# Patient Record
Sex: FEMALE | Race: WHITE | Employment: OTHER | URBAN - METROPOLITAN AREA
[De-identification: names, ages, dates, MRNs, and addresses within clinical notes are randomized per-mention and may not be internally consistent; named-entity substitution may affect disease eponyms.]

---

## 2017-01-18 RX ORDER — CLONIDINE HYDROCHLORIDE 0.1 MG/1
0.1 TABLET ORAL 2 TIMES DAILY
Qty: 180 TABLET | Refills: 2 | Status: SHIPPED | OUTPATIENT
Start: 2017-01-18 | End: 2017-10-12 | Stop reason: SDUPTHER

## 2017-04-03 RX ORDER — ATORVASTATIN CALCIUM 20 MG/1
20 TABLET, FILM COATED ORAL DAILY
Qty: 90 TABLET | Refills: 0 | Status: SHIPPED | OUTPATIENT
Start: 2017-04-03 | End: 2018-08-13 | Stop reason: SDUPTHER

## 2017-04-06 RX ORDER — ATORVASTATIN CALCIUM 20 MG/1
TABLET, FILM COATED ORAL
Qty: 30 TABLET | Refills: 10 | Status: SHIPPED | OUTPATIENT
Start: 2017-04-06 | End: 2018-08-13 | Stop reason: SDUPTHER

## 2017-05-22 ENCOUNTER — TELEPHONE (OUTPATIENT)
Dept: CARDIOLOGY CLINIC | Age: 62
End: 2017-05-22

## 2017-05-22 DIAGNOSIS — I10 ESSENTIAL HYPERTENSION, BENIGN: Primary | ICD-10-CM

## 2017-05-22 DIAGNOSIS — Z79.899 ENCOUNTER FOR LONG-TERM (CURRENT) USE OF MEDICATIONS: ICD-10-CM

## 2017-05-22 DIAGNOSIS — I42.8 OTHER PRIMARY CARDIOMYOPATHIES: ICD-10-CM

## 2017-05-22 RX ORDER — FUROSEMIDE 20 MG/1
20 TABLET ORAL DAILY
Qty: 30 TABLET | Refills: 6 | Status: SHIPPED | OUTPATIENT
Start: 2017-05-22 | End: 2019-12-19

## 2017-06-16 RX ORDER — LISINOPRIL 20 MG/1
TABLET ORAL
Qty: 180 TABLET | Refills: 1 | Status: SHIPPED | OUTPATIENT
Start: 2017-06-16 | End: 2017-12-15 | Stop reason: SDUPTHER

## 2017-09-29 ENCOUNTER — TELEPHONE (OUTPATIENT)
Dept: CARDIOLOGY CLINIC | Age: 62
End: 2017-09-29

## 2017-09-29 NOTE — TELEPHONE ENCOUNTER
Patient called for refills of Coreg 25 mg. Would like this called to [facility] in [place]. Looks like her last appointment was 3/28/16.

## 2017-10-02 RX ORDER — CARVEDILOL 25 MG/1
TABLET ORAL
Qty: 180 TABLET | Refills: 3 | Status: SHIPPED | OUTPATIENT
Start: 2017-10-02 | End: 2018-10-01 | Stop reason: SDUPTHER

## 2017-10-13 RX ORDER — CLONIDINE HYDROCHLORIDE 0.1 MG/1
TABLET ORAL
Qty: 60 TABLET | Refills: 3 | Status: SHIPPED | OUTPATIENT
Start: 2017-10-13 | End: 2018-05-04 | Stop reason: SDUPTHER

## 2017-12-15 ENCOUNTER — TELEPHONE (OUTPATIENT)
Dept: CARDIOLOGY CLINIC | Age: 62
End: 2017-12-15

## 2017-12-15 RX ORDER — LISINOPRIL 20 MG/1
TABLET ORAL
Qty: 180 TABLET | Refills: 0 | Status: SHIPPED | OUTPATIENT
Start: 2017-12-15 | End: 2018-03-26 | Stop reason: SDUPTHER

## 2018-03-26 RX ORDER — LISINOPRIL 20 MG/1
TABLET ORAL
Qty: 180 TABLET | Refills: 0 | Status: SHIPPED | OUTPATIENT
Start: 2018-03-26 | End: 2018-06-21 | Stop reason: SDUPTHER

## 2018-03-26 NOTE — TELEPHONE ENCOUNTER
Patient called, rx was denied due to not being seen since 3/16. She states she has been out of work and will obtain insurance 6/18. appt made for 6/22/18 to see [id number]. Ok to fill Lisinopril?

## 2018-05-07 RX ORDER — CLONIDINE HYDROCHLORIDE 0.1 MG/1
TABLET ORAL
Qty: 60 TABLET | Refills: 3 | Status: SHIPPED | OUTPATIENT
Start: 2018-05-07 | End: 2018-10-01 | Stop reason: SDUPTHER

## 2018-06-22 RX ORDER — LISINOPRIL 20 MG/1
TABLET ORAL
Qty: 180 TABLET | Refills: 0 | Status: SHIPPED | OUTPATIENT
Start: 2018-06-22 | End: 2018-10-04 | Stop reason: SDUPTHER

## 2018-08-13 ENCOUNTER — OFFICE VISIT (OUTPATIENT)
Dept: CARDIOLOGY CLINIC | Age: 63
End: 2018-08-13

## 2018-08-13 VITALS
HEART RATE: 72 BPM | BODY MASS INDEX: 26.62 KG/M2 | DIASTOLIC BLOOD PRESSURE: 90 MMHG | WEIGHT: 141 LBS | HEIGHT: 61 IN | SYSTOLIC BLOOD PRESSURE: 154 MMHG

## 2018-08-13 DIAGNOSIS — I42.9 PRIMARY CARDIOMYOPATHY (HCC): ICD-10-CM

## 2018-08-13 DIAGNOSIS — I10 ESSENTIAL HYPERTENSION, BENIGN: Primary | ICD-10-CM

## 2018-08-13 DIAGNOSIS — E78.5 HYPERLIPIDEMIA, UNSPECIFIED HYPERLIPIDEMIA TYPE: ICD-10-CM

## 2018-08-13 DIAGNOSIS — R55 SYNCOPE, UNSPECIFIED SYNCOPE TYPE: ICD-10-CM

## 2018-08-13 PROCEDURE — 99214 OFFICE O/P EST MOD 30 MIN: CPT | Performed by: INTERNAL MEDICINE

## 2018-08-13 PROCEDURE — 93000 ELECTROCARDIOGRAM COMPLETE: CPT | Performed by: INTERNAL MEDICINE

## 2018-08-13 RX ORDER — ATORVASTATIN CALCIUM 20 MG/1
TABLET, FILM COATED ORAL
Qty: 90 TABLET | Refills: 3 | Status: SHIPPED | OUTPATIENT
Start: 2018-08-13 | End: 2019-12-19 | Stop reason: SDUPTHER

## 2018-08-13 NOTE — PROGRESS NOTES
[address]   Cardiac Evaluation      Patient: Sammi Layton  YOB: 1955  Date: 8/13/18     Chief Complaint   Patient presents with    Hypertension    Cardiomyopathy      Referring provider: Shira Capone MD    History of Present Illness:   Ms. Jose L Ochoa is seen today for follow up of her hypertension. She is also treated for cardiomyopathy. She has not been seen since 2016; she has not had insurance. She is eligible for Medicare but has not signed up for this. An angiogram performed in 2006 revealed normal coronaries and EF 25%. Most recent ECHO stable. Today, Ms. Barroso states that she feels fairly well. She did have an episode 2 weeks ago where she came home from work and passed out. She states she was feeding her cats, felt her nose was hot and she \"passed out\". She states her blood pressure was 147/103 at the time and blood pressure machine read that her heart rate was irregular. EKG shows NSR today. Past Medical History:   has a past medical history of Cardiomyopathy ([address] and Hyperlipidemia. Surgical History:   has a past surgical history that includes Tonsillectomy. Social History:  History     Social History    Marital Status: Single     Spouse Name: N/A     Number of Children: N/A    Years of Education: N/A     Occupational History    Works in a factory     Social History Main Topics    Smoking status: Never Smoker     Smokeless tobacco: Never Used    Alcohol Use: No    Drug Use: No    Sexually Active:        Family History:  family history includes Alzheimer's Disease in her mother; Asthma in her father. Allergies:  Patient has no known allergies. Review of Systems:   · Constitutional: there has been no unanticipated weight loss. No change in energy or activity level   · Eyes: No visual changes   · ENT: No Headaches, hearing loss or vertigo. No mouth sores or sore throat.   · Cardiovascular: Reviewed in HPI  · Respiratory: No cough or wheezing, today   2. Other primary cardiomyopathies: Improved. -ECHO 3/11/16> -Left ventricle size is normal.  -Mild concentric left ventricular hypertrophy is present.  -Left ventricular function is low normal with ejection fraction estimated at 50-55 %. -There is akinesis of the distal septal wall. -There is reversal of E/A inflow velocities across the mitral valve  suggesting impaired left ventricular relaxation. -There is mild pulmonic and mitral, and trivial aortic and tricuspid regurgitation with RVSP estimated at 19 mmHg. -The left atrium is mildly dilated. -ECHO 9/24/10> essentially normal with EF 55%  -GXT Afdy 5/30/06> anteroapical, septal wall infarct, anteroapical portion of myocardium appears akinetic, abnormal uptake in the inferior wall and inferolateral wall, with some mild reversibility suggested diffuse hypokinesia of wall motion is otherwise demonstrated, EF 23% (false positive for ischemia)  -Angiogram 6/1/06> normal coronary arteries, low LVEDP, reduced right atrial pressure,reduced pulmonary capillary wedge pressure and reduced cardiac output is noted, severe LV dysfunction with EF 25%, mild MR, basil and mid portions of inferior wall appear to be akinetic       PLAN:  CBC, CMP, lipids, Echo. When she gets her insurance she needs an echo. Scribe's attestation: This note was scribed in the presence of Dr Kaylie Ruiz MD by Poncho Allne RN. The scribe's documentation has been prepared under my direction and personally reviewed by me in its entirety. I confirm that the note above accurately reflects all work, treatment, procedures, and medical decision making performed by me. .    Thank you for allowing to me to participate in the care of [address] [place] [address].

## 2018-08-13 NOTE — LETTER
[address] Cardiology [facility]  [address] [place] Coreen Salazar [place] [address]  Phone: [phone]  Fax: [phone]    Stuart Kahn MD        August 22, 2018     Sharri, [address] [place] [address]    Patient: Alberta Craft  MR Number: [id number]  YOB: 1955  Date of Visit: 8/13/2018    Dear Dr. Harrell:    Thank you for the request for consultation for Tonie Ro to me for the evaluation of Martine. Below are the relevant portions of my assessment and plan of care. [facility]   Cardiac Evaluation      Patient: Alberta Craft  YOB: 1955  Date: 8/13/18     Chief Complaint   Patient presents with    Hypertension    Cardiomyopathy      Referring provider: MD Sharri    History of Present Illness:   Ms. Felicia Blake is seen today for follow up of her hypertension. She is also treated for cardiomyopathy. She has not been seen since 2016; she has not had insurance. She is eligible for Medicare but has not signed up for this. An angiogram performed in 2006 revealed normal coronaries and EF 25%. Most recent ECHO stable. Today, Ms. Barroso states that she feels fairly well. She did have an episode 2 weeks ago where she came home from work and passed out. She states she was feeding her cats, felt her nose was hot and she \"passed out\". She states her blood pressure was 147/103 at the time and blood pressure machine read that her heart rate was irregular. EKG shows NSR today. Past Medical History:   has a past medical history of Cardiomyopathy ([address] and Hyperlipidemia. Surgical History:   has a past surgical history that includes Tonsillectomy.      Social History:  History     Social History    Marital Status: Single     Spouse Name: N/A     Number of Children: N/A    Years of Education: N/A     Occupational History    Works in a factory     Social History Main Topics    Smoking status: Never Smoker The scribe's documentation has been prepared under my direction and personally reviewed by me in its entirety. I confirm that the note above accurately reflects all work, treatment, procedures, and medical decision making performed by me. .    Thank you for allowing to me to participate in the care of [address] [place] [address]. If you have questions, please do not hesitate to call me. I look forward to following Abby Cadet along with you.     Sincerely,        Yamel Flores MD

## 2018-08-22 NOTE — COMMUNICATION BODY
no sputum production. No hematemesis. · Gastrointestinal: No abdominal pain, appetite loss, blood in stools. No change in bowel or bladder habits. · Genitourinary: No nocturia, dysuria, trouble voiding  · Musculoskeletal:  No gait disturbance, weakness or joint complaints. · Integumentary: No rash or pruritis. · Neurological: No headache, change in muscle strength, numbness or tingling. No change in gait, balance, coordination, mood, affect, memory, mentation, behavior. · Psychiatric: No anxiety or depression  · Endocrine: No malaise or fever  · Hematologic/Lymphatic: No abnormal bruising or bleeding, blood clots or swollen lymph nodes. · Allergic/Immunologic: No nasal congestion or hives. Physical Examination:    Vitals:    08/13/18 1517 08/13/18 1521   BP: (!) 154/90 (!) 154/90   Site: Right Arm    Position: Sitting    Cuff Size: Medium Adult    Pulse: 72    Weight: 141 lb (64 kg)    Height: 5' 1\" (1.549 m)      Wt Readings from Last 3 Encounters:   08/13/18 141 lb (64 kg)   03/28/16 152 lb (68.9 kg)   03/04/16 149 lb (67.6 kg)     BP Readings from Last 3 Encounters:   08/13/18 (!) 154/90   03/28/16 130/78   03/04/16 (!) 192/110     Constitutional and General Appearance:  appears stated age  Respiratory:  · Normal excursion and expansion without use of accessory muscles  · Resp Auscultation: Normal breath sounds without dullness  Cardiovascular:  · The apical impulses not displaced  · Heart is regular rate and rhythm with normal S1, S2  · The carotid upstroke is normal, no bruit noted   · JVP is not elevated  · Peripheral pulses are symmetrical  · There is no clubbing, cyanosis of the extremities  · No edema  · Femoral Arteries: 2+ and equal  · Pedal Pulses: 2+ and equal   Abdomen:  · No masses or tenderness  · Normal bowel sounds  Neurological/Psychiatric:  · Alert and oriented x3  · Moves all extremities well  · Exhibits normal gait balance and coordination    Assessment/Plan  1.  Hypertension - high

## 2018-10-01 RX ORDER — CLONIDINE HYDROCHLORIDE 0.1 MG/1
TABLET ORAL
Qty: 180 TABLET | Refills: 3 | Status: SHIPPED | OUTPATIENT
Start: 2018-10-01 | End: 2019-06-19 | Stop reason: SDUPTHER

## 2018-10-01 RX ORDER — CARVEDILOL 25 MG/1
TABLET ORAL
Qty: 180 TABLET | Refills: 3 | Status: SHIPPED | OUTPATIENT
Start: 2018-10-01 | End: 2019-11-20 | Stop reason: SDUPTHER

## 2018-10-05 RX ORDER — LISINOPRIL 20 MG/1
TABLET ORAL
Qty: 180 TABLET | Refills: 1 | Status: SHIPPED | OUTPATIENT
Start: 2018-10-05 | End: 2019-05-08 | Stop reason: SDUPTHER

## 2019-05-10 RX ORDER — LISINOPRIL 20 MG/1
TABLET ORAL
Qty: 180 TABLET | Refills: 1 | Status: SHIPPED | OUTPATIENT
Start: 2019-05-10 | End: 2019-11-20 | Stop reason: SDUPTHER

## 2019-06-20 RX ORDER — CLONIDINE HYDROCHLORIDE 0.1 MG/1
TABLET ORAL
Qty: 60 TABLET | Refills: 0 | Status: SHIPPED | OUTPATIENT
Start: 2019-06-20 | End: 2019-11-20 | Stop reason: SDUPTHER

## 2019-11-20 ENCOUNTER — TELEPHONE (OUTPATIENT)
Dept: CARDIOLOGY CLINIC | Age: 64
End: 2019-11-20

## 2019-11-20 RX ORDER — CLONIDINE HYDROCHLORIDE 0.1 MG/1
TABLET ORAL
Qty: 60 TABLET | Refills: 1 | Status: SHIPPED | OUTPATIENT
Start: 2019-11-20 | End: 2019-12-19

## 2019-11-20 RX ORDER — CARVEDILOL 25 MG/1
TABLET ORAL
Qty: 60 TABLET | Refills: 1 | Status: SHIPPED | OUTPATIENT
Start: 2019-11-20 | End: 2019-12-19 | Stop reason: SDUPTHER

## 2019-11-20 RX ORDER — LISINOPRIL 20 MG/1
TABLET ORAL
Qty: 60 TABLET | Refills: 1 | Status: SHIPPED | OUTPATIENT
Start: 2019-11-20 | End: 2019-12-19 | Stop reason: SDUPTHER

## 2019-12-19 ENCOUNTER — OFFICE VISIT (OUTPATIENT)
Dept: CARDIOLOGY CLINIC | Age: 64
End: 2019-12-19
Payer: COMMERCIAL

## 2019-12-19 VITALS
SYSTOLIC BLOOD PRESSURE: 128 MMHG | BODY MASS INDEX: 25.68 KG/M2 | WEIGHT: 136 LBS | DIASTOLIC BLOOD PRESSURE: 80 MMHG | HEART RATE: 70 BPM | HEIGHT: 61 IN

## 2019-12-19 DIAGNOSIS — I10 ESSENTIAL HYPERTENSION, BENIGN: Primary | ICD-10-CM

## 2019-12-19 DIAGNOSIS — E78.49 OTHER HYPERLIPIDEMIA: ICD-10-CM

## 2019-12-19 DIAGNOSIS — R07.89 OTHER CHEST PAIN: ICD-10-CM

## 2019-12-19 DIAGNOSIS — I42.9 PRIMARY CARDIOMYOPATHY (HCC): ICD-10-CM

## 2019-12-19 PROCEDURE — 93000 ELECTROCARDIOGRAM COMPLETE: CPT | Performed by: INTERNAL MEDICINE

## 2019-12-19 PROCEDURE — 99214 OFFICE O/P EST MOD 30 MIN: CPT | Performed by: INTERNAL MEDICINE

## 2019-12-19 RX ORDER — CARVEDILOL 25 MG/1
TABLET ORAL
Qty: 180 TABLET | Refills: 3 | Status: SHIPPED | OUTPATIENT
Start: 2019-12-19 | End: 2021-01-29 | Stop reason: SDUPTHER

## 2019-12-19 RX ORDER — CLONIDINE HYDROCHLORIDE 0.1 MG/1
0.1 TABLET ORAL 2 TIMES DAILY
COMMUNITY
End: 2019-12-19 | Stop reason: SDUPTHER

## 2019-12-19 RX ORDER — ATORVASTATIN CALCIUM 20 MG/1
TABLET, FILM COATED ORAL
Qty: 90 TABLET | Refills: 3 | Status: SHIPPED | OUTPATIENT
Start: 2019-12-19 | End: 2021-01-29 | Stop reason: SDUPTHER

## 2019-12-19 RX ORDER — LISINOPRIL 20 MG/1
TABLET ORAL
Qty: 180 TABLET | Refills: 3 | Status: SHIPPED | OUTPATIENT
Start: 2019-12-19 | End: 2021-01-29 | Stop reason: SDUPTHER

## 2019-12-19 RX ORDER — CLONIDINE HYDROCHLORIDE 0.1 MG/1
0.1 TABLET ORAL 2 TIMES DAILY
Qty: 180 TABLET | Refills: 3 | Status: SHIPPED | OUTPATIENT
Start: 2019-12-19 | End: 2021-01-29 | Stop reason: SDUPTHER

## 2019-12-20 ENCOUNTER — TELEPHONE (OUTPATIENT)
Dept: CARDIOLOGY CLINIC | Age: 64
End: 2019-12-20

## 2020-07-23 ENCOUNTER — OFFICE VISIT (OUTPATIENT)
Dept: CARDIOLOGY CLINIC | Age: 65
End: 2020-07-23
Payer: MEDICARE

## 2020-07-23 VITALS
HEART RATE: 80 BPM | SYSTOLIC BLOOD PRESSURE: 170 MMHG | BODY MASS INDEX: 27.19 KG/M2 | WEIGHT: 144 LBS | HEIGHT: 61 IN | DIASTOLIC BLOOD PRESSURE: 90 MMHG | OXYGEN SATURATION: 97 %

## 2020-07-23 PROCEDURE — 99214 OFFICE O/P EST MOD 30 MIN: CPT | Performed by: INTERNAL MEDICINE

## 2020-07-23 PROCEDURE — 3017F COLORECTAL CA SCREEN DOC REV: CPT | Performed by: INTERNAL MEDICINE

## 2020-07-23 PROCEDURE — G8428 CUR MEDS NOT DOCUMENT: HCPCS | Performed by: INTERNAL MEDICINE

## 2020-07-23 PROCEDURE — G8417 CALC BMI ABV UP PARAM F/U: HCPCS | Performed by: INTERNAL MEDICINE

## 2020-07-23 PROCEDURE — 1090F PRES/ABSN URINE INCON ASSESS: CPT | Performed by: INTERNAL MEDICINE

## 2020-07-23 PROCEDURE — G8400 PT W/DXA NO RESULTS DOC: HCPCS | Performed by: INTERNAL MEDICINE

## 2020-07-23 PROCEDURE — 1123F ACP DISCUSS/DSCN MKR DOCD: CPT | Performed by: INTERNAL MEDICINE

## 2020-07-23 PROCEDURE — 1036F TOBACCO NON-USER: CPT | Performed by: INTERNAL MEDICINE

## 2020-07-23 PROCEDURE — 4040F PNEUMOC VAC/ADMIN/RCVD: CPT | Performed by: INTERNAL MEDICINE

## 2020-07-23 NOTE — PROGRESS NOTES
[address]   Cardiac Evaluation      Patient: Amada Mccauley  YOB: 1955  Date: 7/23/20     Chief Complaint   Patient presents with    Hypertension    Cardiomyopathy      Referring provider: No primary care provider on file. History of Present Illness:   Ms. Sheila Watson is seen today for follow up of her hypertension. She is also treated for cardiomyopathy. She is eligible for Medicare but has not signed up for this. An angiogram performed in 2006 revealed normal coronaries and EF 25%. Most recent ECHO with a normal EF. Today, Ms. Barroso states she has been fine. She is retired from work. 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 Hang Fabian denies any chest pain, palpitations, STOUT, dizziness, or edema. Past Medical History:   has a past medical history of Cardiomyopathy ([address] and Hyperlipidemia. Surgical History:   has a past surgical history that includes Tonsillectomy. Social History:  History     Social History    Marital Status: Single     Spouse Name: N/A     Number of Children: N/A    Years of Education: N/A     Occupational History    Works in a factory     Social History Main Topics    Smoking status: Never Smoker     Smokeless tobacco: Never Used    Alcohol Use: No    Drug Use: No    Sexually Active:        Family History:  family history includes Alzheimer's Disease in her mother; Asthma in her father. Allergies:  Patient has no known allergies. Review of Systems:   · Constitutional: there has been no unanticipated weight loss. No change in energy or activity level   · Eyes: No visual changes   · ENT: No Headaches, hearing loss or vertigo. No mouth sores or sore throat. · Cardiovascular: Reviewed in HPI  · Respiratory: No cough or wheezing, no sputum production. No hematemesis. · Gastrointestinal: No abdominal pain, appetite loss, blood in stools. No change in bowel or bladder habits.   · Genitourinary: No nocturia, dysuria, trouble voiding  · Musculoskeletal:  No gait disturbance, weakness or joint complaints. · Integumentary: No rash or pruritis. · Neurological: No headache, change in muscle strength, numbness or tingling. No change in gait, balance, coordination, mood, affect, memory, mentation, behavior. · Psychiatric: No anxiety or depression  · Endocrine: No malaise or fever  · Hematologic/Lymphatic: No abnormal bruising or bleeding, blood clots or swollen lymph nodes. · Allergic/Immunologic: No nasal congestion or hives. Physical Examination:    Vitals:    07/23/20 1401 07/23/20 1404   BP: (!) 176/90 (!) 170/90   Site: Left Upper Arm    Position: Sitting    Cuff Size: Medium Adult    Pulse: 80    SpO2: 97%    Weight: 144 lb (65.3 kg)    Height: 5' 1\" (1.549 m)      Wt Readings from Last 3 Encounters:   07/23/20 144 lb (65.3 kg)   12/19/19 136 lb (61.7 kg)   08/13/18 141 lb (64 kg)     BP Readings from Last 3 Encounters:   07/23/20 (!) 170/90   12/19/19 128/80   08/13/18 (!) 154/90     Constitutional and General Appearance:  appears stated age  Respiratory:  · Normal excursion and expansion without use of accessory muscles  · Resp Auscultation: Normal breath sounds without dullness  Cardiovascular:  · The apical impulses not displaced  · Heart is regular rate and rhythm with normal S1, S2  · The carotid upstroke is normal, no bruit noted   · JVP is not elevated  · Peripheral pulses are symmetrical  · There is no clubbing, cyanosis of the extremities  · No edema  · Femoral Arteries: 2+ and equal  · Pedal Pulses: 2+ and equal   Abdomen:  · No masses or tenderness  · Normal bowel sounds  Neurological/Psychiatric:  · Alert and oriented x3  · Moves all extremities well  · Exhibits normal gait balance and coordination    Assessment/Plan  1. Hypertension - high here, better at home   2.  Other primary cardiomyopathies: Improved  -ECHO 3/11/16> -Left ventricle size is normal. Mild concentric left ventricular hypertrophy is

## 2021-01-08 ENCOUNTER — TELEPHONE (OUTPATIENT)
Dept: CARDIOLOGY CLINIC | Age: 66
End: 2021-01-08

## 2021-01-12 ENCOUNTER — TELEPHONE (OUTPATIENT)
Dept: CARDIOLOGY CLINIC | Age: 66
End: 2021-01-12

## 2021-01-12 NOTE — TELEPHONE ENCOUNTER
Asking that Song Reyes call her today to schedule her appt with [facility] in [place]

## 2021-01-29 ENCOUNTER — OFFICE VISIT (OUTPATIENT)
Dept: CARDIOLOGY CLINIC | Age: 66
End: 2021-01-29
Payer: MEDICARE

## 2021-01-29 VITALS
DIASTOLIC BLOOD PRESSURE: 100 MMHG | OXYGEN SATURATION: 95 % | SYSTOLIC BLOOD PRESSURE: 184 MMHG | HEART RATE: 90 BPM | HEIGHT: 61 IN | BODY MASS INDEX: 27.19 KG/M2 | WEIGHT: 144 LBS

## 2021-01-29 DIAGNOSIS — I10 ESSENTIAL HYPERTENSION, BENIGN: ICD-10-CM

## 2021-01-29 DIAGNOSIS — E78.5 HYPERLIPIDEMIA, UNSPECIFIED HYPERLIPIDEMIA TYPE: ICD-10-CM

## 2021-01-29 DIAGNOSIS — I42.9 PRIMARY CARDIOMYOPATHY (HCC): Primary | ICD-10-CM

## 2021-01-29 PROCEDURE — 1036F TOBACCO NON-USER: CPT | Performed by: INTERNAL MEDICINE

## 2021-01-29 PROCEDURE — G8400 PT W/DXA NO RESULTS DOC: HCPCS | Performed by: INTERNAL MEDICINE

## 2021-01-29 PROCEDURE — G8417 CALC BMI ABV UP PARAM F/U: HCPCS | Performed by: INTERNAL MEDICINE

## 2021-01-29 PROCEDURE — G8428 CUR MEDS NOT DOCUMENT: HCPCS | Performed by: INTERNAL MEDICINE

## 2021-01-29 PROCEDURE — G8484 FLU IMMUNIZE NO ADMIN: HCPCS | Performed by: INTERNAL MEDICINE

## 2021-01-29 PROCEDURE — 1090F PRES/ABSN URINE INCON ASSESS: CPT | Performed by: INTERNAL MEDICINE

## 2021-01-29 PROCEDURE — 1123F ACP DISCUSS/DSCN MKR DOCD: CPT | Performed by: INTERNAL MEDICINE

## 2021-01-29 PROCEDURE — 4040F PNEUMOC VAC/ADMIN/RCVD: CPT | Performed by: INTERNAL MEDICINE

## 2021-01-29 PROCEDURE — 99214 OFFICE O/P EST MOD 30 MIN: CPT | Performed by: INTERNAL MEDICINE

## 2021-01-29 PROCEDURE — 3017F COLORECTAL CA SCREEN DOC REV: CPT | Performed by: INTERNAL MEDICINE

## 2021-01-29 RX ORDER — AMLODIPINE BESYLATE 5 MG/1
5 TABLET ORAL DAILY
Qty: 90 TABLET | Refills: 3 | Status: SHIPPED | OUTPATIENT
Start: 2021-01-29 | End: 2021-03-02

## 2021-01-29 RX ORDER — LISINOPRIL 20 MG/1
TABLET ORAL
Qty: 180 TABLET | Refills: 3 | Status: SHIPPED | OUTPATIENT
Start: 2021-01-29 | End: 2021-03-26

## 2021-01-29 RX ORDER — ATORVASTATIN CALCIUM 20 MG/1
TABLET, FILM COATED ORAL
Qty: 90 TABLET | Refills: 3 | Status: SHIPPED | OUTPATIENT
Start: 2021-01-29 | End: 2022-03-07 | Stop reason: SDUPTHER

## 2021-01-29 RX ORDER — CARVEDILOL 25 MG/1
TABLET ORAL
Qty: 180 TABLET | Refills: 3 | Status: SHIPPED | OUTPATIENT
Start: 2021-01-29 | End: 2022-04-01 | Stop reason: SDUPTHER

## 2021-01-29 RX ORDER — CLONIDINE HYDROCHLORIDE 0.1 MG/1
0.1 TABLET ORAL 2 TIMES DAILY
Qty: 180 TABLET | Refills: 3 | Status: SHIPPED | OUTPATIENT
Start: 2021-01-29 | End: 2022-04-01 | Stop reason: SDUPTHER

## 2021-01-29 NOTE — PROGRESS NOTES
[address]   Cardiac Evaluation      Patient: Yonathan Min  YOB: 1955  Date: 1/29/21     Chief Complaint   Patient presents with    Hypertension    Cardiomyopathy      Referring provider: No primary care provider on file. History of Present Illness:   Ms. Liang Marie is seen today for follow up of her hypertension. She is also treated for cardiomyopathy. An angiogram performed in 2006 revealed normal coronaries and EF 25%. Most recent ECHO with a normal EF. Today, Ms. Barroso states she has been fine. She has not been monitoring her b/p at home. Zhane Chavez denies any chest pain, palpitations, STOUT, dizziness, or edema. Past Medical History:   has a past medical history of Cardiomyopathy ([address] and Hyperlipidemia. Surgical History:   has a past surgical history that includes Tonsillectomy. Social History:  History     Social History    Marital Status: Single     Spouse Name: N/A     Number of Children: N/A    Years of Education: N/A     Occupational History    Works in a factory     Social History Main Topics    Smoking status: Never Smoker     Smokeless tobacco: Never Used    Alcohol Use: No    Drug Use: No    Sexually Active:        Family History:  family history includes Alzheimer's Disease in her mother; Asthma in her father. Allergies:  Patient has no known allergies. Review of Systems:   · Constitutional: there has been no unanticipated weight loss. No change in energy or activity level   · Eyes: No visual changes   · ENT: No Headaches, hearing loss or vertigo. No mouth sores or sore throat. · Cardiovascular: Reviewed in HPI  · Respiratory: No cough or wheezing, no sputum production. No hematemesis. · Gastrointestinal: No abdominal pain, appetite loss, blood in stools. No change in bowel or bladder habits. · Genitourinary: No nocturia, dysuria, trouble voiding  · Musculoskeletal:  No gait disturbance, weakness or joint complaints.   · Integumentary: No rash or pruritis. · Neurological: No headache, change in muscle strength, numbness or tingling. No change in gait, balance, coordination, mood, affect, memory, mentation, behavior. · Psychiatric: No anxiety or depression  · Endocrine: No malaise or fever  · Hematologic/Lymphatic: No abnormal bruising or bleeding, blood clots or swollen lymph nodes. · Allergic/Immunologic: No nasal congestion or hives. Physical Examination:    Vitals:    01/29/21 1324 01/29/21 1328   BP: (!) 180/90 (!) 184/100   Site: Left Upper Arm Left Upper Arm   Position: Sitting Sitting   Cuff Size: Medium Adult    Pulse: 90    SpO2: 95%    Weight: 144 lb (65.3 kg)    Height: 5' 1\" (1.549 m)      Wt Readings from Last 3 Encounters:   01/29/21 144 lb (65.3 kg)   07/23/20 144 lb (65.3 kg)   12/19/19 136 lb (61.7 kg)     BP Readings from Last 3 Encounters:   01/29/21 (!) 184/100   07/23/20 (!) 170/90   12/19/19 128/80     Constitutional and General Appearance:  appears stated age  Respiratory:  · Normal excursion and expansion without use of accessory muscles  · Resp Auscultation: Normal breath sounds without dullness  Cardiovascular:  · The apical impulses not displaced  · Heart is regular rate and rhythm with normal S1, S2  · The carotid upstroke is normal, no bruit noted   · JVP is not elevated  · Peripheral pulses are symmetrical  · There is no clubbing, cyanosis of the extremities  · No edema  · Femoral Arteries: 2+ and equal  · Pedal Pulses: 2+ and equal   Abdomen:  · No masses or tenderness  · Normal bowel sounds  Neurological/Psychiatric:  · Alert and oriented x3  · Moves all extremities well  · Exhibits normal gait balance and coordination    Assessment/Plan  1. Hypertension - high, has not been checking at home   2. Other primary cardiomyopathies: Improved  -ECHO 3/11/16> -Left ventricle size is normal. Mild concentric left ventricular hypertrophy is present.   Left ventricular function is low normal with ejection fraction estimated

## 2021-02-02 LAB
A/G RATIO: 1.9 (ref 1–2)
ALBUMIN SERPL-MCNC: 4.2 G/DL (ref 3.5–5.7)
ALBUMIN/PROTEIN TOTAL, SER/PL: 6.4 G/DL (ref 6–8.3)
ALP BLD-CCNC: 104 U/L (ref 34–104)
ALT SERPL-CCNC: 17 U/L (ref 7–52)
ANION GAP 4: 7 MMOL/L (ref 7–16)
AST W/O P-5'-P: 23 U/L (ref 15–39)
BILIRUB SERPL-MCNC: 0.7 MG/DL (ref 0.3–1)
BUN BLDV-MCNC: 14 MG/DL (ref 7–25)
CALCIUM SERPL-MCNC: 10.1 MG/DL (ref 8.6–10.2)
CHLORIDE BLD-SCNC: 108 MMOL/L (ref 98–107)
CHOLESTEROL, STONE: 189 MG/DL
CO2: 30 MMOL/L (ref 21–31)
CREATININE + EGFR PANEL: 0.9 MG/DL (ref 0.6–1.2)
GFR CALCULATED: > 60 ML/MIN/1.73M2
GFR CALCULATED: > 60 ML/MIN/1.73M2
GLOBULIN: 2.2 G/DL (ref 2.6–4.2)
GLUCOSE: 102 MG/DL (ref 74–109)
HDLC SERPL-MCNC: 89 MG/DL (ref 40–60)
LDL CHOLESTEROL CALCULATED: 88 MG/DL (ref 0–99)
POTASSIUM SERPL-SCNC: 4.4 MMOL/L (ref 3.5–5.3)
SODIUM BLD-SCNC: 145 MMOL/L (ref 136–145)
TRIGL SERPL-MCNC: 62 MG/DL
VLDLC SERPL CALC-MCNC: 12 MG/DL (ref 0–40)

## 2021-02-17 LAB
CALCULI COMPOSITION: NORMAL
MASS: 14 MG
STONE DESCRIPTION: NORMAL
STONE NUMBER: 1
STONE SIZE: NORMAL MM

## 2021-03-02 RX ORDER — AMLODIPINE BESYLATE 10 MG/1
10 TABLET ORAL DAILY
Qty: 90 TABLET | Refills: 3 | Status: SHIPPED | OUTPATIENT
Start: 2021-03-02 | End: 2021-03-26

## 2021-03-02 NOTE — TELEPHONE ENCOUNTER
Patient brought b/p readings for Dr Christoph Child to review (scanned under media). Per Dr Che>increase Amlodipine to 10mg daily and watch for swelling in LE's. I relayed instructions to pt. She verbalized understanding. rx sent to [facility].

## 2021-03-26 ENCOUNTER — TELEPHONE (OUTPATIENT)
Dept: CARDIOLOGY CLINIC | Age: 66
End: 2021-03-26

## 2021-03-26 RX ORDER — AMLODIPINE BESYLATE 5 MG/1
5 TABLET ORAL DAILY
Qty: 90 TABLET | Refills: 3 | Status: SHIPPED | OUTPATIENT
Start: 2021-03-26 | End: 2021-08-05

## 2021-03-26 RX ORDER — LISINOPRIL 40 MG/1
TABLET ORAL
Qty: 180 TABLET | Refills: 3 | Status: SHIPPED | OUTPATIENT
Start: 2021-03-26 | End: 2022-04-01 | Stop reason: SDUPTHER

## 2021-03-26 NOTE — TELEPHONE ENCOUNTER
Spoke w/ pt and gave instructions per Dr Alvarado Gutierrez. She verbalized understanding.  rx's to [id number]

## 2021-08-05 ENCOUNTER — OFFICE VISIT (OUTPATIENT)
Dept: CARDIOLOGY CLINIC | Age: 66
End: 2021-08-05
Payer: MEDICARE

## 2021-08-05 VITALS
BODY MASS INDEX: 25.3 KG/M2 | HEIGHT: 61 IN | WEIGHT: 134 LBS | HEART RATE: 66 BPM | SYSTOLIC BLOOD PRESSURE: 166 MMHG | DIASTOLIC BLOOD PRESSURE: 88 MMHG

## 2021-08-05 DIAGNOSIS — I42.9 PRIMARY CARDIOMYOPATHY (HCC): ICD-10-CM

## 2021-08-05 DIAGNOSIS — E78.49 OTHER HYPERLIPIDEMIA: ICD-10-CM

## 2021-08-05 DIAGNOSIS — I10 ESSENTIAL HYPERTENSION, BENIGN: Primary | ICD-10-CM

## 2021-08-05 PROCEDURE — 1090F PRES/ABSN URINE INCON ASSESS: CPT | Performed by: INTERNAL MEDICINE

## 2021-08-05 PROCEDURE — 3017F COLORECTAL CA SCREEN DOC REV: CPT | Performed by: INTERNAL MEDICINE

## 2021-08-05 PROCEDURE — 1123F ACP DISCUSS/DSCN MKR DOCD: CPT | Performed by: INTERNAL MEDICINE

## 2021-08-05 PROCEDURE — 93000 ELECTROCARDIOGRAM COMPLETE: CPT | Performed by: INTERNAL MEDICINE

## 2021-08-05 PROCEDURE — G8417 CALC BMI ABV UP PARAM F/U: HCPCS | Performed by: INTERNAL MEDICINE

## 2021-08-05 PROCEDURE — G8400 PT W/DXA NO RESULTS DOC: HCPCS | Performed by: INTERNAL MEDICINE

## 2021-08-05 PROCEDURE — 1036F TOBACCO NON-USER: CPT | Performed by: INTERNAL MEDICINE

## 2021-08-05 PROCEDURE — G8427 DOCREV CUR MEDS BY ELIG CLIN: HCPCS | Performed by: INTERNAL MEDICINE

## 2021-08-05 PROCEDURE — 99214 OFFICE O/P EST MOD 30 MIN: CPT | Performed by: INTERNAL MEDICINE

## 2021-08-05 PROCEDURE — 4040F PNEUMOC VAC/ADMIN/RCVD: CPT | Performed by: INTERNAL MEDICINE

## 2021-08-05 NOTE — PROGRESS NOTES
[facility]   Cardiac Evaluation      Patient: Eduardo Meraz  YOB: 1955  Date: 8/5/21     Chief Complaint   Patient presents with    Hypertension    Cardiomyopathy      Referring provider: No primary care provider on file. History of Present Illness:   Ms. Radha Jackson is seen today for follow up of her hypertension. She is also treated for cardiomyopathy. An angiogram performed in 2006 revealed normal coronaries and EF 25%. Most recent ECHO with a normal EF. Today, Ms. Barroso states she has been feeling well. She denies any chest pain, palpitations, STOUT, dizziness, or edema. systolic B/p at home [phone]. She is active and works in her yard. Past Medical History:   has a past medical history of Cardiomyopathy ([address] and Hyperlipidemia. Surgical History:   has a past surgical history that includes Tonsillectomy. Social History:  History     Social History    Marital Status: Single     Spouse Name: N/A     Number of Children: N/A    Years of Education: N/A     Occupational History    Works in a factory     Social History Main Topics    Smoking status: Never Smoker     Smokeless tobacco: Never Used    Alcohol Use: No    Drug Use: No    Sexually Active:        Family History:  family history includes Alzheimer's Disease in her mother; Asthma in her father. Allergies:  Patient has no known allergies. Review of Systems:   · Constitutional: there has been no unanticipated weight loss. No change in energy or activity level   · Eyes: No visual changes   · ENT: No Headaches, hearing loss or vertigo. No mouth sores or sore throat. · Cardiovascular: Reviewed in HPI  · Respiratory: No cough or wheezing, no sputum production. No hematemesis. · Gastrointestinal: No abdominal pain, appetite loss, blood in stools. No change in bowel or bladder habits.   · Genitourinary: No nocturia, dysuria, trouble voiding  · Musculoskeletal:  No gait disturbance, weakness or joint complaints. · Integumentary: No rash or pruritis. · Neurological: No headache, change in muscle strength, numbness or tingling. No change in gait, balance, coordination, mood, affect, memory, mentation, behavior. · Psychiatric: No anxiety or depression  · Endocrine: No malaise or fever  · Hematologic/Lymphatic: No abnormal bruising or bleeding, blood clots or swollen lymph nodes. · Allergic/Immunologic: No nasal congestion or hives. Physical Examination:    Vitals:    08/05/21 1301 08/05/21 1307   BP: (!) 170/84 (!) 166/88   Site: Left Upper Arm Left Upper Arm   Position: Sitting Sitting   Cuff Size: Medium Adult Medium Adult   Pulse: 96    Weight: 134 lb (60.8 kg)    Height: 5' 1\" (1.549 m)      Wt Readings from Last 3 Encounters:   08/05/21 134 lb (60.8 kg)   01/29/21 144 lb (65.3 kg)   07/23/20 144 lb (65.3 kg)     BP Readings from Last 3 Encounters:   08/05/21 (!) 166/88   01/29/21 (!) 184/100   07/23/20 (!) 170/90     Constitutional and General Appearance:  appears stated age  Respiratory:  · Normal excursion and expansion without use of accessory muscles  · Resp Auscultation: Normal breath sounds without dullness  Cardiovascular:  · The apical impulses not displaced  · Heart is regular rate and rhythm with normal S1, S2  · The carotid upstroke is normal, no bruit noted   · JVP is not elevated  · Peripheral pulses are symmetrical  · There is no clubbing, cyanosis of the extremities  · No edema  · Femoral Arteries: 2+ and equal  · Pedal Pulses: 2+ and equal   Abdomen:  · No masses or tenderness  · Normal bowel sounds  Neurological/Psychiatric:  · Alert and oriented x3  · Moves all extremities well  · Exhibits normal gait balance and coordination      EKG>poor R wave progression    Assessment/Plan  1. Hypertension - high here but controlled at home   2. Other primary cardiomyopathies: Improved  -ECHO 3/11/16> -Left ventricle size is normal. Mild concentric left ventricular hypertrophy is present.   Left ventricular function is low normal with ejection fraction estimated at 50-55%. -There is akinesis of the distal septal wall. -There is reversal of E/A inflow velocities across the mitral valve  suggesting impaired left ventricular relaxation. -There is mild pulmonic and mitral, and trivial aortic and tricuspid regurgitation with RVSP estimated at 19 mmHg. -The left atrium is mildly dilated. -ECHO 9/24/10> essentially normal with EF 55%  -GXT Fady 5/30/06> anteroapical, septal wall infarct, anteroapical portion of myocardium appears akinetic, abnormal uptake in the inferior wall and inferolateral wall, with some mild reversibility suggested diffuse hypokinesia of wall motion is otherwise demonstrated, EF 23% (false positive for ischemia)  -Angiogram 6/1/06> normal coronary arteries, low LVEDP, reduced right atrial pressure,reduced pulmonary capillary wedge pressure and reduced cardiac output is noted, severe LV dysfunction with EF 25%, mild MR, basil and mid portions of inferior wall appear to be akinetic   3. Hyperlipidemia - Labs 2/2/21: ; TRIG 62; HDL 89; LDL 88, LIPITOR 20MG    PLAN:  Jany's b/p readings from home are controlled. Will repeat an echo for evaluation of her cmp. . Encouraged to get regular exercise. FU 6 months. Scribe's attestation: This note was scribed in the presence of Dr Marcelle Mercado MD by Tigre Mcleod, CAMILO. The scribe's documentation has been prepared under my direction and personally reviewed by me in its entirety. I confirm that the note above accurately reflects all work, treatment, procedures, and medical decision making performed by me. Thank you for allowing to me to participate in the care of [address] [place] [address].

## 2021-08-20 ENCOUNTER — HOSPITAL ENCOUNTER (OUTPATIENT)
Dept: CARDIOLOGY | Age: 66
Discharge: HOME | End: 2021-08-20
Payer: MEDICARE

## 2021-08-20 DIAGNOSIS — I42.9 PRIMARY CARDIOMYOPATHY (HCC): ICD-10-CM

## 2021-08-20 DIAGNOSIS — I10 ESSENTIAL HYPERTENSION, BENIGN: ICD-10-CM

## 2021-08-20 LAB
LV EF: 53 %
LVEF MODALITY: NORMAL

## 2021-08-20 PROCEDURE — 93306 TTE W/DOPPLER COMPLETE: CPT

## 2022-03-07 RX ORDER — ATORVASTATIN CALCIUM 20 MG/1
TABLET, FILM COATED ORAL
Qty: 30 TABLET | Refills: 0 | Status: SHIPPED | OUTPATIENT
Start: 2022-03-07 | End: 2022-04-01 | Stop reason: SDUPTHER

## 2022-03-07 NOTE — TELEPHONE ENCOUNTER
Patient called for refill on Atorvastatin to [facility]. She needs lipids and CMP, but cannot reach her. #30 w/ 0RF given until appt 4/1/22.

## 2022-04-01 ENCOUNTER — OFFICE VISIT (OUTPATIENT)
Dept: CARDIOLOGY CLINIC | Age: 67
End: 2022-04-01
Payer: MEDICARE

## 2022-04-01 VITALS
OXYGEN SATURATION: 96 % | HEIGHT: 61 IN | WEIGHT: 140.1 LBS | RESPIRATION RATE: 19 BRPM | BODY MASS INDEX: 26.45 KG/M2 | SYSTOLIC BLOOD PRESSURE: 198 MMHG | DIASTOLIC BLOOD PRESSURE: 110 MMHG | HEART RATE: 88 BPM

## 2022-04-01 DIAGNOSIS — I42.9 PRIMARY CARDIOMYOPATHY (HCC): Primary | ICD-10-CM

## 2022-04-01 DIAGNOSIS — I10 ESSENTIAL HYPERTENSION, BENIGN: ICD-10-CM

## 2022-04-01 DIAGNOSIS — E78.49 OTHER HYPERLIPIDEMIA: ICD-10-CM

## 2022-04-01 PROCEDURE — 99214 OFFICE O/P EST MOD 30 MIN: CPT | Performed by: INTERNAL MEDICINE

## 2022-04-01 RX ORDER — ATORVASTATIN CALCIUM 20 MG/1
TABLET, FILM COATED ORAL
Qty: 90 TABLET | Refills: 3 | Status: SHIPPED | OUTPATIENT
Start: 2022-04-01

## 2022-04-01 RX ORDER — CARVEDILOL 25 MG/1
TABLET ORAL
Qty: 180 TABLET | Refills: 3 | Status: SHIPPED | OUTPATIENT
Start: 2022-04-01

## 2022-04-01 RX ORDER — LISINOPRIL 40 MG/1
TABLET ORAL
Qty: 180 TABLET | Refills: 3 | Status: SHIPPED | OUTPATIENT
Start: 2022-04-01

## 2022-04-01 RX ORDER — CLONIDINE HYDROCHLORIDE 0.1 MG/1
0.1 TABLET ORAL 2 TIMES DAILY
Qty: 180 TABLET | Refills: 3 | Status: SHIPPED | OUTPATIENT
Start: 2022-04-01

## 2022-04-01 NOTE — PROGRESS NOTES
[address]   Cardiac Evaluation      Patient: Mee Coppola  YOB: 1955  Date: 4/1/22     Chief Complaint   Patient presents with    6 Month Follow-Up    Hypertension    Hyperlipidemia      Referring provider: No primary care provider on file. History of Present Illness:   Ms. Mariaa Marx is seen today for follow up of her hypertension. She is also treated for cardiomyopathy. An angiogram performed in 2006 revealed normal coronaries and EF 25%. Most recent ECHO with a near normal EF. Today, Ms. Barroso states her b/p has been good at home although it is very high here today. . She last checked it 1 week ago and it was 161 systolic. [facility] denies chest pain, palpitations, STOUT, dizziness, or edema. She cleans house for an elderly lady. For exercise, she walks. Past Medical History:   has a past medical history of Cardiomyopathy ([address] and Hyperlipidemia. Current Outpatient Medications:     cloNIDine (CATAPRES) 0.1 MG tablet, Take 1 tablet by mouth 2 times daily, Disp: 180 tablet, Rfl: 3    carvedilol (COREG) 25 MG tablet, TAKE ONE TABLET BY MOUTH TWICE DAILY, Disp: 180 tablet, Rfl: 3    lisinopril (PRINIVIL;ZESTRIL) 40 MG tablet, TAKE 1 TABLET BY MOUTH TWICE DAILY, Disp: 180 tablet, Rfl: 3    atorvastatin (LIPITOR) 20 MG tablet, TAKE 1 TABLET BY MOUTH ONE TIME A DAY, Disp: 90 tablet, Rfl: 3    mometasone (NASONEX) 50 MCG/ACT nasal spray, 2 sprays by Nasal route daily, Disp: 1 Inhaler, Rfl: 1     Surgical History:   has a past surgical history that includes Tonsillectomy.      Social History:  History     Social History    Marital Status: Single     Spouse Name: N/A     Number of Children: N/A    Years of Education: N/A     Occupational History    Retired from the [employer]    Smoking status: Never Smoker     Smokeless tobacco: Never Used    Alcohol Use: No    Drug Use: No    Sexually Active:        Family History:  family history includes Alzheimer's Disease in her mother; Asthma in her father. Allergies:  Amlodipine     Review of Systems:   · Constitutional: there has been no unanticipated weight loss. No change in energy or activity level   · Eyes: No visual changes   · ENT: No Headaches, hearing loss or vertigo. No mouth sores or sore throat. · Cardiovascular: Reviewed in HPI  · Respiratory: No cough or wheezing, no sputum production. No hematemesis. · Gastrointestinal: No abdominal pain, appetite loss, blood in stools. No change in bowel or bladder habits. · Genitourinary: No nocturia, dysuria, trouble voiding  · Musculoskeletal:  No gait disturbance, weakness or joint complaints. · Integumentary: No rash or pruritis. · Neurological: No headache, change in muscle strength, numbness or tingling. No change in gait, balance, coordination, mood, affect, memory, mentation, behavior. · Psychiatric: No anxiety or depression  · Endocrine: No malaise or fever  · Hematologic/Lymphatic: No abnormal bruising or bleeding, blood clots or swollen lymph nodes. · Allergic/Immunologic: No nasal congestion or hives.     Physical Examination:    Vitals:    04/01/22 1257 04/01/22 1301   BP: (!) 190/110 (!) 198/110   Site: Right Upper Arm Left Upper Arm   Position: Sitting Sitting   Cuff Size: Medium Adult Medium Adult   Pulse: 82 88   Resp: 20 19   SpO2: 98% 96%   Weight: 140 lb 1.6 oz (63.5 kg)    Height: 5' 1\" (1.549 m)      Wt Readings from Last 3 Encounters:   04/01/22 140 lb 1.6 oz (63.5 kg)   08/05/21 134 lb (60.8 kg)   01/29/21 144 lb (65.3 kg)     BP Readings from Last 3 Encounters:   04/01/22 (!) 198/110   08/05/21 (!) 166/88   01/29/21 (!) 184/100     Constitutional and General Appearance:  appears stated age  Respiratory:  · Normal excursion and expansion without use of accessory muscles  · Resp Auscultation: Normal breath sounds without dullness  Cardiovascular:  · The apical impulses not displaced  · Heart is regular rate and rhythm with normal S1, S2  · The carotid upstroke is normal, no bruit noted   · JVP is not elevated  · Peripheral pulses are symmetrical  · There is no clubbing, cyanosis of the extremities  · No edema  · Femoral Arteries: 2+ and equal  · Pedal Pulses: 2+ and equal   Abdomen:  · No masses or tenderness  · Normal bowel sounds  Neurological/Psychiatric:  · Alert and oriented x3  · Moves all extremities well  · Exhibits normal gait balance and coordination      EKG>poor R wave progression    Assessment/Plan  1. Hypertension - high here but controlled at home   2. Other primary cardiomyopathies: Improved  -ECHO 8/20/21>Normal left ventricle size, mild concentric wall thickness and borderline systolic function with EF 50-55%. There is minimal diffuse hypokinesis with distal septal mild hypokinesis. Grade I diastolic dysfunction with normal LV filling pressures. E/e\"=8.4. Mild mitral regurgitation. Mild aortic regurgitation. Trivial tricuspid regurgitation. Trivial pulmonic regurgitation present. Estimated pulmonary artery systolic pressure is normal at 29 mmHg assuming a right atrial pressure of 8 mmHg.  -ECHO 3/11/16> -Left ventricle size is normal. Mild concentric left ventricular hypertrophy is present. Left ventricular function is low normal with ejection fraction estimated at 50-55%. -There is akinesis of the distal septal wall. -There is reversal of E/A inflow velocities across the mitral valve  suggesting impaired left ventricular relaxation. -There is mild pulmonic and mitral, and trivial aortic and tricuspid regurgitation with RVSP estimated at 19 mmHg. -The left atrium is mildly dilated.   -ECHO 9/24/10> essentially normal with EF 55%  -GXT Fady 5/30/06> anteroapical, septal wall infarct, anteroapical portion of myocardium appears akinetic, abnormal uptake in the inferior wall and inferolateral wall, with some mild reversibility suggested diffuse hypokinesia of wall motion is otherwise demonstrated, EF 23% (false positive for ischemia)  -Angiogram 6/1/06> normal coronary arteries, low LVEDP, reduced right atrial pressure,reduced pulmonary capillary wedge pressure and reduced cardiac output is noted, severe LV dysfunction with EF 25%, mild MR, basil and mid portions of inferior wall appear to be akinetic   3. Hyperlipidemia - Labs 2/2/21: ; TRIG 62; HDL 89; LDL 88, Lipitor 20MG      PLAN:  Advised to take an extra Clonidine when she gets home if sb/p still high. Will repeat lipids and CMP. Regular exercise is encouraged. FU 6 months. Scribe's attestation: This note was scribed in the presence of Dr Porter Zelaya MD by Monty Gentile, CAMILO. The scribe's documentation has been prepared under my direction and personally reviewed by me in its entirety. I confirm that the note above accurately reflects all work, treatment, procedures, and medical decision making performed by me. Thank you for allowing to me to participate in the care of [address] [place] [address].

## 2022-04-04 LAB
A/G RATIO: 1.6 (ref 1–2)
ALBUMIN SERPL-MCNC: 4.2 G/DL (ref 3.5–5.7)
ALBUMIN/PROTEIN TOTAL, SER/PL: 6.8 G/DL (ref 6–8.3)
ALP BLD-CCNC: 99 U/L (ref 34–104)
ALT SERPL-CCNC: 20 U/L (ref 7–52)
ANION GAP 4: 8 MMOL/L (ref 7–16)
AST W/O P-5'-P: 20 U/L (ref 13–39)
BILIRUB SERPL-MCNC: 0.8 MG/DL (ref 0.3–1)
BUN BLDV-MCNC: 16 MG/DL (ref 7–25)
CALCIUM SERPL-MCNC: 10.2 MG/DL (ref 8.6–10.2)
CHLORIDE BLD-SCNC: 105 MMOL/L (ref 98–107)
CHOLESTEROL, STONE: 188 MG/DL
CO2: 28 MMOL/L (ref 21–31)
CREATININE + EGFR PANEL: 1.1 MG/DL (ref 0.6–1.2)
GFR CALCULATED: 49 ML/MIN/1.73M2
GFR CALCULATED: 60 ML/MIN/1.73M2
GLOBULIN: 2.6 G/DL (ref 2.6–4.2)
GLUCOSE: 91 MG/DL (ref 74–109)
HDLC SERPL-MCNC: 91 MG/DL (ref 40–60)
LDL CHOLESTEROL CALCULATED: 86 MG/DL (ref 0–99)
POTASSIUM SERPL-SCNC: 4.3 MMOL/L (ref 3.5–5.3)
SODIUM BLD-SCNC: 141 MMOL/L (ref 136–145)
TRIGL SERPL-MCNC: 56 MG/DL
VLDLC SERPL CALC-MCNC: 11 MG/DL (ref 0–40)

## 2022-10-04 NOTE — PROGRESS NOTES
[facility]   Cardiac Evaluation      Patient: Alex Spann  YOB: 1955  Date: 10/6/22     Chief Complaint   Patient presents with    Hypertension    Hyperlipidemia    Cardiomyopathy        Referring provider: No primary care provider on file. History of Present Illness:   Ms. Saida Mcdermott is seen today for follow up of her hypertension. She is also treated for cardiomyopathy. An angiogram performed in 2006 revealed normal coronaries and EF 25%. Most recent ECHO 8/20/21 showed EF 50-55%    Today, Ms. Barroso states she has been feeling ok. She denies chest pain, palpitations, STOUT, dizziness, or edema. 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 Past Medical History:   has a past medical history of Cardiomyopathy ([address] and Hyperlipidemia. Current Outpatient Medications:     cloNIDine (CATAPRES) 0.1 MG tablet, Take 1 tablet by mouth 2 times daily, Disp: 180 tablet, Rfl: 3    carvedilol (COREG) 25 MG tablet, TAKE ONE TABLET BY MOUTH TWICE DAILY, Disp: 180 tablet, Rfl: 3    lisinopril (PRINIVIL;ZESTRIL) 40 MG tablet, TAKE 1 TABLET BY MOUTH TWICE DAILY, Disp: 180 tablet, Rfl: 3    atorvastatin (LIPITOR) 20 MG tablet, TAKE 1 TABLET BY MOUTH ONE TIME A DAY, Disp: 90 tablet, Rfl: 3    mometasone (NASONEX) 50 MCG/ACT nasal spray, 2 sprays by Nasal route daily, Disp: 1 Inhaler, Rfl: 1     Surgical History:   has a past surgical history that includes Tonsillectomy. Social History:  History     Social History    Marital Status: Single     Spouse Name: N/A     Number of Children: N/A    Years of Education: N/A     Occupational History    Retired from the [employer] History Main Topics    Smoking status: Never Smoker     Smokeless tobacco: Never Used    Alcohol Use: No    Drug Use: No    Sexually Active:        Family History:  family history includes Alzheimer's Disease in her mother; Asthma in her father.       Allergies:  Amlodipine Review of Systems:   Constitutional: there has been no unanticipated weight loss. No change in energy or activity level   Eyes: No visual changes   ENT: No Headaches, hearing loss or vertigo. No mouth sores or sore throat. Cardiovascular: Reviewed in HPI  Respiratory: No cough or wheezing, no sputum production. No hematemesis. Gastrointestinal: No abdominal pain, appetite loss, blood in stools. No change in bowel or bladder habits. Genitourinary: No nocturia, dysuria, trouble voiding  Musculoskeletal:  No gait disturbance, weakness or joint complaints. Integumentary: No rash or pruritis. Neurological: No headache, change in muscle strength, numbness or tingling. No change in gait, balance, coordination, mood, affect, memory, mentation, behavior. Psychiatric: No anxiety or depression  Endocrine: No malaise or fever  Hematologic/Lymphatic: No abnormal bruising or bleeding, blood clots or swollen lymph nodes. Allergic/Immunologic: No nasal congestion or hives. Physical Examination:    Vitals:    10/06/22 1328 10/06/22 1332   BP: (!) 144/90 (!) 150/90   Site: Left Upper Arm Left Upper Arm   Position: Sitting Sitting   Cuff Size: Medium Adult Medium Adult   Pulse: 86    SpO2: 97%    Weight: 137 lb (62.1 kg)    Height: 5' 1\" (1.549 m)        Wt Readings from Last 3 Encounters:   10/06/22 137 lb (62.1 kg)   04/01/22 140 lb 1.6 oz (63.5 kg)   08/05/21 134 lb (60.8 kg)     BP Readings from Last 3 Encounters:   10/06/22 (!) 150/90   04/01/22 (!) 198/110   08/05/21 (!) 166/88     Constitutional and General Appearance:  appears stated age  Respiratory:  Normal excursion and expansion without use of accessory muscles  Resp Auscultation: Normal breath sounds without dullness  Cardiovascular:   The apical impulses not displaced  Heart is regular rate and rhythm with normal S1, S2  The carotid upstroke is normal, no bruit noted   JVP is not elevated  Peripheral pulses are symmetrical  There is no clubbing, cyanosis of the extremities  No edema  Femoral Arteries: 2+ and equal  Pedal Pulses: 2+ and equal   Abdomen:  No masses or tenderness  Normal bowel sounds  Neurological/Psychiatric:  Alert and oriented x3  Moves all extremities well  Exhibits normal gait balance and coordination        Assessment/Plan  1. Hypertension - always high in the office, she checks at home 216-124 systolic   2. Other primary cardiomyopathies: Improved  -ECHO 8/20/21>Normal left ventricle size, mild concentric wall thickness and borderline systolic function with EF 50-55%. There is minimal diffuse hypokinesis with distal septal mild hypokinesis. Grade I diastolic dysfunction with normal LV filling pressures. E/e\"=8.4. Mild mitral regurgitation. Mild aortic regurgitation. Trivial tricuspid regurgitation. Trivial pulmonic regurgitation present. Estimated pulmonary artery systolic pressure is normal at 29 mmHg assuming a right atrial pressure of 8 mmHg.  -ECHO 3/11/16> -Left ventricle size is normal. Mild concentric left ventricular hypertrophy is present. Left ventricular function is low normal with ejection fraction estimated at 50-55%. -There is akinesis of the distal septal wall. -There is reversal of E/A inflow velocities across the mitral valve  suggesting impaired left ventricular relaxation. -There is mild pulmonic and mitral, and trivial aortic and tricuspid regurgitation with RVSP estimated at 19 mmHg. -The left atrium is mildly dilated.   -ECHO 9/24/10> essentially normal with EF 55%  -GXT Fady 5/30/06> anteroapical, septal wall infarct, anteroapical portion of myocardium appears akinetic, abnormal uptake in the inferior wall and inferolateral wall, with some mild reversibility suggested diffuse hypokinesia of wall motion is otherwise demonstrated, EF 23% (false positive for ischemia)  -Angiogram 6/1/06> normal coronary arteries, low LVEDP, reduced right atrial pressure,reduced pulmonary capillary wedge pressure and reduced cardiac output is noted, severe LV dysfunction with EF 25%, mild MR, basil and mid portions of inferior wall appear to be akinetic   3. Hyperlipidemia - Labs 4/4/22: ; TRIG 56; HDL 91; LDL 86, Lipitor 20MG      PLAN:  Rosa Blackmon appears stable. No med changes. Regular exercise encouraged. FU 6 months. Scribe's attestation: This note was scribed in the presence of Dr Wali Rodriguez MD by Norma Dorado RN. The scribe's documentation has been prepared under my direction and personally reviewed by me in its entirety. I confirm that the note above accurately reflects all work, treatment, procedures, and medical decision making performed by me. Thank you for allowing to me to participate in the care of [address] [place] [address].

## 2022-10-06 ENCOUNTER — OFFICE VISIT (OUTPATIENT)
Dept: CARDIOLOGY CLINIC | Age: 67
End: 2022-10-06
Payer: MEDICARE

## 2022-10-06 VITALS
HEIGHT: 61 IN | SYSTOLIC BLOOD PRESSURE: 150 MMHG | WEIGHT: 137 LBS | BODY MASS INDEX: 25.86 KG/M2 | HEART RATE: 86 BPM | OXYGEN SATURATION: 97 % | DIASTOLIC BLOOD PRESSURE: 90 MMHG

## 2022-10-06 DIAGNOSIS — I10 ESSENTIAL HYPERTENSION, BENIGN: Primary | ICD-10-CM

## 2022-10-06 DIAGNOSIS — E78.49 OTHER HYPERLIPIDEMIA: ICD-10-CM

## 2022-10-06 DIAGNOSIS — I42.9 PRIMARY CARDIOMYOPATHY (HCC): ICD-10-CM

## 2022-10-06 PROCEDURE — 1123F ACP DISCUSS/DSCN MKR DOCD: CPT | Performed by: INTERNAL MEDICINE

## 2022-10-06 PROCEDURE — 99214 OFFICE O/P EST MOD 30 MIN: CPT | Performed by: INTERNAL MEDICINE

## 2022-12-28 RX ORDER — CLONIDINE HYDROCHLORIDE 0.1 MG/1
TABLET ORAL
Qty: 180 TABLET | Refills: 0 | Status: SHIPPED | OUTPATIENT
Start: 2022-12-28

## 2022-12-28 RX ORDER — CARVEDILOL 25 MG/1
TABLET ORAL
Qty: 180 TABLET | Refills: 0 | Status: SHIPPED | OUTPATIENT
Start: 2022-12-28

## 2022-12-28 RX ORDER — ATORVASTATIN CALCIUM 20 MG/1
TABLET, FILM COATED ORAL
Qty: 90 TABLET | Refills: 0 | Status: SHIPPED | OUTPATIENT
Start: 2022-12-28

## 2022-12-28 RX ORDER — LISINOPRIL 40 MG/1
TABLET ORAL
Qty: 180 TABLET | Refills: 0 | Status: SHIPPED | OUTPATIENT
Start: 2022-12-28

## 2023-03-15 ENCOUNTER — TELEPHONE (OUTPATIENT)
Dept: CARDIOLOGY CLINIC | Age: 68
End: 2023-03-15

## 2023-03-15 NOTE — TELEPHONE ENCOUNTER
Pt called because she woke up and had some edema in her left leg, no other symptoms. She did recently go to the ER on 3/9 she had a UTI and was started on Cephalexin and also Methocarbamol.

## 2023-03-24 NOTE — PROGRESS NOTES
LVEDP, reduced right atrial pressure,reduced pulmonary capillary wedge pressure and reduced cardiac output is noted, severe LV dysfunction with EF 25%, mild MR, basil and mid portions of inferior wall appear to be akinetic   3. Hyperlipidemia - Labs 4/4/22: ; TRIG 56; HDL 91; LDL 86, Lipitor 20MG      PLAN:  Repeat lipids. No med changes. FU 6 months. Scribe's attestation: This note was scribed in the presence of Dr Kailyn Rosario MD by Haroldo Johnson RN. The scribe's documentation has been prepared under my direction and personally reviewed by me in its entirety. I confirm that the note above accurately reflects all work, treatment, procedures, and medical decision making performed by me. Thank you for allowing to me to participate in the care of [address] [place] [address].

## 2023-04-06 ENCOUNTER — OFFICE VISIT (OUTPATIENT)
Dept: CARDIOLOGY CLINIC | Age: 68
End: 2023-04-06
Payer: MEDICARE

## 2023-04-06 VITALS
SYSTOLIC BLOOD PRESSURE: 126 MMHG | HEART RATE: 74 BPM | BODY MASS INDEX: 25.11 KG/M2 | WEIGHT: 133 LBS | HEIGHT: 61 IN | DIASTOLIC BLOOD PRESSURE: 72 MMHG | OXYGEN SATURATION: 98 %

## 2023-04-06 DIAGNOSIS — I42.9 PRIMARY CARDIOMYOPATHY (HCC): ICD-10-CM

## 2023-04-06 DIAGNOSIS — E78.49 OTHER HYPERLIPIDEMIA: ICD-10-CM

## 2023-04-06 DIAGNOSIS — I10 ESSENTIAL HYPERTENSION, BENIGN: Primary | ICD-10-CM

## 2023-04-06 PROCEDURE — G8400 PT W/DXA NO RESULTS DOC: HCPCS | Performed by: INTERNAL MEDICINE

## 2023-04-06 PROCEDURE — 93000 ELECTROCARDIOGRAM COMPLETE: CPT | Performed by: INTERNAL MEDICINE

## 2023-04-06 PROCEDURE — 99214 OFFICE O/P EST MOD 30 MIN: CPT | Performed by: INTERNAL MEDICINE

## 2023-04-06 PROCEDURE — 3078F DIAST BP <80 MM HG: CPT | Performed by: INTERNAL MEDICINE

## 2023-04-06 PROCEDURE — 1036F TOBACCO NON-USER: CPT | Performed by: INTERNAL MEDICINE

## 2023-04-06 PROCEDURE — 3074F SYST BP LT 130 MM HG: CPT | Performed by: INTERNAL MEDICINE

## 2023-04-06 PROCEDURE — G8417 CALC BMI ABV UP PARAM F/U: HCPCS | Performed by: INTERNAL MEDICINE

## 2023-04-06 PROCEDURE — G8427 DOCREV CUR MEDS BY ELIG CLIN: HCPCS | Performed by: INTERNAL MEDICINE

## 2023-04-06 PROCEDURE — 3017F COLORECTAL CA SCREEN DOC REV: CPT | Performed by: INTERNAL MEDICINE

## 2023-04-06 PROCEDURE — 1090F PRES/ABSN URINE INCON ASSESS: CPT | Performed by: INTERNAL MEDICINE

## 2023-04-06 PROCEDURE — 1123F ACP DISCUSS/DSCN MKR DOCD: CPT | Performed by: INTERNAL MEDICINE

## 2023-04-06 RX ORDER — ALENDRONATE SODIUM 70 MG/1
70 TABLET ORAL WEEKLY
COMMUNITY
Start: 2023-03-21

## 2023-04-07 LAB
CHOLESTEROL, STONE: 193 MG/DL
HDLC SERPL-MCNC: 86 MG/DL (ref 40–60)
LDL CHOLESTEROL CALCULATED: 93 MG/DL (ref 0–99)
TRIGL SERPL-MCNC: 68 MG/DL
VLDLC SERPL CALC-MCNC: 14 MG/DL (ref 0–40)

## 2023-06-05 RX ORDER — CARVEDILOL 25 MG/1
TABLET ORAL
Qty: 180 TABLET | Refills: 3 | Status: SHIPPED | OUTPATIENT
Start: 2023-06-05

## 2023-06-05 RX ORDER — LISINOPRIL 40 MG/1
TABLET ORAL
Qty: 180 TABLET | Refills: 3 | Status: SHIPPED | OUTPATIENT
Start: 2023-06-05

## 2023-06-05 RX ORDER — CLONIDINE HYDROCHLORIDE 0.1 MG/1
TABLET ORAL
Qty: 180 TABLET | Refills: 3 | Status: SHIPPED | OUTPATIENT
Start: 2023-06-05

## 2023-06-05 RX ORDER — ATORVASTATIN CALCIUM 20 MG/1
TABLET, FILM COATED ORAL
Qty: 90 TABLET | Refills: 3 | Status: SHIPPED | OUTPATIENT
Start: 2023-06-05

## 2023-11-02 ENCOUNTER — OFFICE VISIT (OUTPATIENT)
Dept: CARDIOLOGY CLINIC | Age: 68
End: 2023-11-02
Payer: MEDICARE

## 2023-11-02 VITALS
BODY MASS INDEX: 22.84 KG/M2 | SYSTOLIC BLOOD PRESSURE: 160 MMHG | WEIGHT: 121 LBS | OXYGEN SATURATION: 97 % | HEART RATE: 80 BPM | HEIGHT: 61 IN | DIASTOLIC BLOOD PRESSURE: 80 MMHG

## 2023-11-02 DIAGNOSIS — I10 ESSENTIAL HYPERTENSION, BENIGN: Primary | ICD-10-CM

## 2023-11-02 DIAGNOSIS — E78.49 OTHER HYPERLIPIDEMIA: ICD-10-CM

## 2023-11-02 DIAGNOSIS — I42.9 PRIMARY CARDIOMYOPATHY (HCC): ICD-10-CM

## 2023-11-02 PROCEDURE — 1123F ACP DISCUSS/DSCN MKR DOCD: CPT | Performed by: INTERNAL MEDICINE

## 2023-11-02 PROCEDURE — 1036F TOBACCO NON-USER: CPT | Performed by: INTERNAL MEDICINE

## 2023-11-02 PROCEDURE — G8484 FLU IMMUNIZE NO ADMIN: HCPCS | Performed by: INTERNAL MEDICINE

## 2023-11-02 PROCEDURE — 99214 OFFICE O/P EST MOD 30 MIN: CPT | Performed by: INTERNAL MEDICINE

## 2023-11-02 PROCEDURE — G8427 DOCREV CUR MEDS BY ELIG CLIN: HCPCS | Performed by: INTERNAL MEDICINE

## 2023-11-02 PROCEDURE — 3074F SYST BP LT 130 MM HG: CPT | Performed by: INTERNAL MEDICINE

## 2023-11-02 PROCEDURE — 3017F COLORECTAL CA SCREEN DOC REV: CPT | Performed by: INTERNAL MEDICINE

## 2023-11-02 PROCEDURE — 1090F PRES/ABSN URINE INCON ASSESS: CPT | Performed by: INTERNAL MEDICINE

## 2023-11-02 PROCEDURE — G8400 PT W/DXA NO RESULTS DOC: HCPCS | Performed by: INTERNAL MEDICINE

## 2023-11-02 PROCEDURE — 3078F DIAST BP <80 MM HG: CPT | Performed by: INTERNAL MEDICINE

## 2023-11-02 PROCEDURE — G8420 CALC BMI NORM PARAMETERS: HCPCS | Performed by: INTERNAL MEDICINE

## 2024-04-22 NOTE — PROGRESS NOTES
[facility]   Cardiac Evaluation      Patient: Jany Barroso  YOB: 1955  Date: 5/2/24     Chief Complaint   Patient presents with    Hypertension    Hyperlipidemia          Referring provider: Michael Zamora DO    History of Present Illness:   Ms. Barroso is seen today for follow up of her hypertension and cardiomyopathy.  An angiogram performed in 2006 revealed normal coronaries and EF 25%. Most recent ECHO 8/20/21 showed EF 50-55%    Today, Ms. Barroso denies chest pain, palpitations, STOUT, dizziness, or edema. She is tolerating her medications.     Past Medical History:   has a past medical history of Cardiomyopathy (HCC) and Hyperlipidemia.       Current Outpatient Medications:     lisinopril (PRINIVIL;ZESTRIL) 40 MG tablet, Take 1 tablet by mouth twice daily, Disp: 180 tablet, Rfl: 3    carvedilol (COREG) 25 MG tablet, Take 1 tablet by mouth twice daily, Disp: 180 tablet, Rfl: 3    atorvastatin (LIPITOR) 20 MG tablet, Take 1 tablet by mouth once daily, Disp: 90 tablet, Rfl: 3    cloNIDine (CATAPRES) 0.1 MG tablet, Take 1 tablet by mouth twice daily, Disp: 180 tablet, Rfl: 3    alendronate (FOSAMAX) 70 MG tablet, Take 1 tablet by mouth once a week, Disp: , Rfl:     mometasone (NASONEX) 50 MCG/ACT nasal spray, 2 sprays by Nasal route daily, Disp: 1 Inhaler, Rfl: 1     Surgical History:   has a past surgical history that includes Tonsillectomy.     Social History:  History     Social History    Marital Status: Single     Spouse Name: N/A     Number of Children: N/A    Years of Education: N/A     Occupational History    Retired from the [employer]     Social History Main Topics    Smoking status: Never Smoker     Smokeless tobacco: Never Used    Alcohol Use: No    Drug Use: No    Sexually Active:        Family History:  family history includes Alzheimer's Disease in her mother; Asthma in her father.      Allergies:  Amlodipine     Review of Systems:   Constitutional: there has been no

## 2024-05-02 ENCOUNTER — OFFICE VISIT (OUTPATIENT)
Dept: CARDIOLOGY CLINIC | Age: 69
End: 2024-05-02
Payer: MEDICARE

## 2024-05-02 VITALS
OXYGEN SATURATION: 96 % | SYSTOLIC BLOOD PRESSURE: 138 MMHG | DIASTOLIC BLOOD PRESSURE: 80 MMHG | WEIGHT: 117 LBS | BODY MASS INDEX: 22.09 KG/M2 | HEART RATE: 84 BPM | HEIGHT: 61 IN

## 2024-05-02 DIAGNOSIS — E78.49 OTHER HYPERLIPIDEMIA: ICD-10-CM

## 2024-05-02 DIAGNOSIS — I10 ESSENTIAL HYPERTENSION, BENIGN: Primary | ICD-10-CM

## 2024-05-02 DIAGNOSIS — I42.9 PRIMARY CARDIOMYOPATHY (HCC): ICD-10-CM

## 2024-05-02 PROCEDURE — G8420 CALC BMI NORM PARAMETERS: HCPCS | Performed by: INTERNAL MEDICINE

## 2024-05-02 PROCEDURE — 1036F TOBACCO NON-USER: CPT | Performed by: INTERNAL MEDICINE

## 2024-05-02 PROCEDURE — 99214 OFFICE O/P EST MOD 30 MIN: CPT | Performed by: INTERNAL MEDICINE

## 2024-05-02 PROCEDURE — 1123F ACP DISCUSS/DSCN MKR DOCD: CPT | Performed by: INTERNAL MEDICINE

## 2024-05-02 PROCEDURE — G8427 DOCREV CUR MEDS BY ELIG CLIN: HCPCS | Performed by: INTERNAL MEDICINE

## 2024-05-02 PROCEDURE — 3017F COLORECTAL CA SCREEN DOC REV: CPT | Performed by: INTERNAL MEDICINE

## 2024-05-02 PROCEDURE — 3075F SYST BP GE 130 - 139MM HG: CPT | Performed by: INTERNAL MEDICINE

## 2024-05-02 PROCEDURE — G8400 PT W/DXA NO RESULTS DOC: HCPCS | Performed by: INTERNAL MEDICINE

## 2024-05-02 PROCEDURE — 3079F DIAST BP 80-89 MM HG: CPT | Performed by: INTERNAL MEDICINE

## 2024-05-02 PROCEDURE — 1090F PRES/ABSN URINE INCON ASSESS: CPT | Performed by: INTERNAL MEDICINE

## 2024-05-02 RX ORDER — ATORVASTATIN CALCIUM 20 MG/1
20 TABLET, FILM COATED ORAL DAILY
Qty: 90 TABLET | Refills: 3 | Status: SHIPPED | OUTPATIENT
Start: 2024-05-02

## 2024-05-02 RX ORDER — LISINOPRIL 40 MG/1
40 TABLET ORAL 2 TIMES DAILY
Qty: 180 TABLET | Refills: 3 | Status: SHIPPED | OUTPATIENT
Start: 2024-05-02

## 2024-05-02 RX ORDER — CLONIDINE HYDROCHLORIDE 0.1 MG/1
0.1 TABLET ORAL 2 TIMES DAILY
Qty: 180 TABLET | Refills: 3 | Status: SHIPPED | OUTPATIENT
Start: 2024-05-02

## 2024-05-02 RX ORDER — CARVEDILOL 25 MG/1
25 TABLET ORAL 2 TIMES DAILY
Qty: 180 TABLET | Refills: 3 | Status: SHIPPED | OUTPATIENT
Start: 2024-05-02

## 2024-05-03 LAB
A/G RATIO: 1.6 (ref 1–2)
ALBUMIN: 3.9 G/DL (ref 3.5–5.7)
ALP BLD-CCNC: 61 U/L (ref 34–104)
ALT SERPL-CCNC: 18 U/L (ref 7–52)
ANION GAP SERPL CALCULATED.3IONS-SCNC: 8 MMOL/L (ref 7–16)
AST SERPL-CCNC: 24 U/L (ref 13–39)
BILIRUB SERPL-MCNC: 0.8 MG/DL (ref 0.3–1)
BUN BLDV-MCNC: 23 MG/DL (ref 7–25)
CALCIUM SERPL-MCNC: 10 MG/DL (ref 8.6–10.2)
CHLORIDE BLD-SCNC: 105 MMOL/L (ref 98–107)
CHOLESTEROL, TOTAL: 164 MG/DL
CO2: 27 MMOL/L (ref 21–31)
CREAT SERPL-MCNC: 1.16 MG/DL (ref 0.6–1.2)
EGFR FEMALE: 51 ML/MIN/1.73M2
GLOBULIN: 2.4 G/DL (ref 2.6–4.2)
GLUCOSE BLD-MCNC: 98 MG/DL (ref 74–109)
HDLC SERPL-MCNC: 86 MG/DL (ref 40–60)
LDL CHOLESTEROL: 68 MG/DL (ref 0–99)
POTASSIUM SERPL-SCNC: 4.4 MMOL/L (ref 3.5–5.1)
SODIUM BLD-SCNC: 140 MMOL/L (ref 136–145)
TOTAL PROTEIN: 6.3 G/DL (ref 6–8.3)
TRIGL SERPL-MCNC: 50 MG/DL
VLDLC SERPL CALC-MCNC: 10 MG/DL (ref 0–40)

## 2024-11-06 NOTE — PROGRESS NOTES
[facility]   Cardiac Evaluation      Patient: Jany Barroso  YOB: 1955  Date: 11/21/24     Chief Complaint   Patient presents with    Hypertension    Hyperlipidemia          Referring provider: Michael Zamora DO    History of Present Illness:   Ms. Barroso is seen today for follow up of her hypertension and cardiomyopathy.  An angiogram performed in 2006 revealed normal coronaries and EF 25%. Most recent ECHO 8/20/21 showed EF 50-55%    Today, Ms. Barroso states her ankle was swelling. Her pcp sent her to the vein clinic for treatment which has set up a recurrent treatment plan for sclerosis of the veins. . She usually wears compression stockings but is not wearing them today. . She denies chest pain, palpitations, STOUT, dizziness.     Past Medical History:   has a past medical history of Cardiomyopathy (HCC) and Hyperlipidemia.       Current Outpatient Medications:     atorvastatin (LIPITOR) 20 MG tablet, Take 1 tablet by mouth daily, Disp: 90 tablet, Rfl: 3    carvedilol (COREG) 25 MG tablet, Take 1 tablet by mouth 2 times daily, Disp: 180 tablet, Rfl: 3    cloNIDine (CATAPRES) 0.1 MG tablet, Take 1 tablet by mouth 2 times daily, Disp: 180 tablet, Rfl: 3    lisinopril (PRINIVIL;ZESTRIL) 40 MG tablet, Take 1 tablet by mouth 2 times daily, Disp: 180 tablet, Rfl: 3    alendronate (FOSAMAX) 70 MG tablet, Take 1 tablet by mouth once a week, Disp: , Rfl:     mometasone (NASONEX) 50 MCG/ACT nasal spray, 2 sprays by Nasal route daily, Disp: 1 Inhaler, Rfl: 1     Surgical History:   has a past surgical history that includes Tonsillectomy.     Social History:  History     Social History    Marital Status: Single     Spouse Name: N/A     Number of Children: N/A    Years of Education: N/A     Occupational History    Retired from the [employer]     Social History Main Topics    Smoking status: Never Smoker     Smokeless tobacco: Never Used    Alcohol Use: No    Drug Use: No    Sexually Active:

## 2024-11-21 ENCOUNTER — OFFICE VISIT (OUTPATIENT)
Dept: CARDIOLOGY CLINIC | Age: 69
End: 2024-11-21
Payer: MEDICARE

## 2024-11-21 VITALS
BODY MASS INDEX: 21.34 KG/M2 | HEIGHT: 61 IN | WEIGHT: 113 LBS | HEART RATE: 70 BPM | DIASTOLIC BLOOD PRESSURE: 80 MMHG | SYSTOLIC BLOOD PRESSURE: 130 MMHG | OXYGEN SATURATION: 98 %

## 2024-11-21 DIAGNOSIS — I42.9 PRIMARY CARDIOMYOPATHY (HCC): ICD-10-CM

## 2024-11-21 DIAGNOSIS — E78.49 OTHER HYPERLIPIDEMIA: ICD-10-CM

## 2024-11-21 DIAGNOSIS — I10 ESSENTIAL HYPERTENSION, BENIGN: Primary | ICD-10-CM

## 2024-11-21 PROCEDURE — G8427 DOCREV CUR MEDS BY ELIG CLIN: HCPCS | Performed by: INTERNAL MEDICINE

## 2024-11-21 PROCEDURE — 1159F MED LIST DOCD IN RCRD: CPT | Performed by: INTERNAL MEDICINE

## 2024-11-21 PROCEDURE — 3017F COLORECTAL CA SCREEN DOC REV: CPT | Performed by: INTERNAL MEDICINE

## 2024-11-21 PROCEDURE — G8400 PT W/DXA NO RESULTS DOC: HCPCS | Performed by: INTERNAL MEDICINE

## 2024-11-21 PROCEDURE — G8484 FLU IMMUNIZE NO ADMIN: HCPCS | Performed by: INTERNAL MEDICINE

## 2024-11-21 PROCEDURE — 3079F DIAST BP 80-89 MM HG: CPT | Performed by: INTERNAL MEDICINE

## 2024-11-21 PROCEDURE — 1090F PRES/ABSN URINE INCON ASSESS: CPT | Performed by: INTERNAL MEDICINE

## 2024-11-21 PROCEDURE — 3075F SYST BP GE 130 - 139MM HG: CPT | Performed by: INTERNAL MEDICINE

## 2024-11-21 PROCEDURE — G8420 CALC BMI NORM PARAMETERS: HCPCS | Performed by: INTERNAL MEDICINE

## 2024-11-21 PROCEDURE — 1123F ACP DISCUSS/DSCN MKR DOCD: CPT | Performed by: INTERNAL MEDICINE

## 2024-11-21 PROCEDURE — 1036F TOBACCO NON-USER: CPT | Performed by: INTERNAL MEDICINE

## 2024-11-21 PROCEDURE — 99214 OFFICE O/P EST MOD 30 MIN: CPT | Performed by: INTERNAL MEDICINE

## 2025-05-14 ENCOUNTER — TELEPHONE (OUTPATIENT)
Dept: CARDIOLOGY CLINIC | Age: 70
End: 2025-05-14

## 2025-05-14 NOTE — TELEPHONE ENCOUNTER
Pt called to reschedule her 5/29/25 1015 appt to [place], but someone else has been scheduled there. Please advise and call pt.

## 2025-05-21 NOTE — PROGRESS NOTES
[facility]   Cardiac Evaluation      Patient: Jany Barroso  YOB: 1955  Date: 5/29/25     Chief Complaint   Patient presents with    Hypertension    Hyperlipidemia          Referring provider: Michael Zamora DO    History of Present Illness:   Ms. Barroso is seen today for follow up of her hypertension and cardiomyopathy.  An angiogram performed in 2006 revealed normal coronaries and EF 25%. Most recent ECHO 8/20/21 showed EF 50-55%    Today, Ms. Barroso states she has chronic back and leg pain. She has been taking steroids which have upset her stomach.  She has lost weight and states she is not eating much. Jany denies chest pain, palpitations, STOUT, dizziness. She is wearing compression hose.       Past Medical History:   has a past medical history of Cardiomyopathy (HCC) and Hyperlipidemia.       Current Outpatient Medications:     raloxifene (EVISTA) 60 MG tablet, Take 1 tablet by mouth daily, Disp: , Rfl:     atorvastatin (LIPITOR) 20 MG tablet, Take 1 tablet by mouth daily, Disp: 90 tablet, Rfl: 3    carvedilol (COREG) 25 MG tablet, Take 1 tablet by mouth 2 times daily, Disp: 180 tablet, Rfl: 3    cloNIDine (CATAPRES) 0.1 MG tablet, Take 1 tablet by mouth 2 times daily, Disp: 180 tablet, Rfl: 3    lisinopril (PRINIVIL;ZESTRIL) 40 MG tablet, Take 1 tablet by mouth 2 times daily, Disp: 180 tablet, Rfl: 3    mometasone (NASONEX) 50 MCG/ACT nasal spray, 2 sprays by Nasal route daily, Disp: 1 Inhaler, Rfl: 1     Surgical History:   has a past surgical history that includes Tonsillectomy.     Social History:  History     Social History    Marital Status: Single     Spouse Name: N/A     Number of Children: N/A    Years of Education: N/A     Occupational History    Retired from the factory     Social History Main Topics    Smoking status: Never Smoker     Smokeless tobacco: Never Used    Alcohol Use: No    Drug Use: No    Sexually Active:        Family History:  family history includes

## 2025-05-29 ENCOUNTER — OFFICE VISIT (OUTPATIENT)
Dept: CARDIOLOGY CLINIC | Age: 70
End: 2025-05-29
Payer: MEDICARE

## 2025-05-29 VITALS
WEIGHT: 109 LBS | HEIGHT: 61 IN | SYSTOLIC BLOOD PRESSURE: 94 MMHG | OXYGEN SATURATION: 96 % | DIASTOLIC BLOOD PRESSURE: 60 MMHG | HEART RATE: 99 BPM | BODY MASS INDEX: 20.58 KG/M2

## 2025-05-29 DIAGNOSIS — I42.9 PRIMARY CARDIOMYOPATHY (HCC): ICD-10-CM

## 2025-05-29 DIAGNOSIS — I10 ESSENTIAL HYPERTENSION, BENIGN: Primary | ICD-10-CM

## 2025-05-29 DIAGNOSIS — R06.02 SHORTNESS OF BREATH: ICD-10-CM

## 2025-05-29 DIAGNOSIS — E78.49 OTHER HYPERLIPIDEMIA: ICD-10-CM

## 2025-05-29 PROCEDURE — G8427 DOCREV CUR MEDS BY ELIG CLIN: HCPCS | Performed by: INTERNAL MEDICINE

## 2025-05-29 PROCEDURE — G8420 CALC BMI NORM PARAMETERS: HCPCS | Performed by: INTERNAL MEDICINE

## 2025-05-29 PROCEDURE — G8400 PT W/DXA NO RESULTS DOC: HCPCS | Performed by: INTERNAL MEDICINE

## 2025-05-29 PROCEDURE — 1123F ACP DISCUSS/DSCN MKR DOCD: CPT | Performed by: INTERNAL MEDICINE

## 2025-05-29 PROCEDURE — 3074F SYST BP LT 130 MM HG: CPT | Performed by: INTERNAL MEDICINE

## 2025-05-29 PROCEDURE — 1036F TOBACCO NON-USER: CPT | Performed by: INTERNAL MEDICINE

## 2025-05-29 PROCEDURE — 3078F DIAST BP <80 MM HG: CPT | Performed by: INTERNAL MEDICINE

## 2025-05-29 PROCEDURE — G2211 COMPLEX E/M VISIT ADD ON: HCPCS | Performed by: INTERNAL MEDICINE

## 2025-05-29 PROCEDURE — 3017F COLORECTAL CA SCREEN DOC REV: CPT | Performed by: INTERNAL MEDICINE

## 2025-05-29 PROCEDURE — 93000 ELECTROCARDIOGRAM COMPLETE: CPT | Performed by: INTERNAL MEDICINE

## 2025-05-29 PROCEDURE — 1159F MED LIST DOCD IN RCRD: CPT | Performed by: INTERNAL MEDICINE

## 2025-05-29 PROCEDURE — 1090F PRES/ABSN URINE INCON ASSESS: CPT | Performed by: INTERNAL MEDICINE

## 2025-05-29 PROCEDURE — 99214 OFFICE O/P EST MOD 30 MIN: CPT | Performed by: INTERNAL MEDICINE

## 2025-05-29 RX ORDER — RALOXIFENE HYDROCHLORIDE 60 MG/1
60 TABLET, FILM COATED ORAL DAILY
COMMUNITY

## 2025-06-02 RX ORDER — CARVEDILOL 25 MG/1
25 TABLET ORAL 2 TIMES DAILY
Qty: 180 TABLET | Refills: 0 | Status: SHIPPED | OUTPATIENT
Start: 2025-06-02

## 2025-06-10 ENCOUNTER — TELEPHONE (OUTPATIENT)
Dept: CARDIOLOGY CLINIC | Age: 70
End: 2025-06-10

## 2025-06-10 NOTE — TELEPHONE ENCOUNTER
Spoke w/ Jany, she has a brace on her left arm. She can take it off, if needed, for the echo.     I spoke w/ Dr Che, who stated pt can reschedule the echo. I called Jany back and LM for her to call to discuss.

## 2025-06-10 NOTE — TELEPHONE ENCOUNTER
Pt states she broke her arm on 5/3025 and is wearing a brace. Asking if she should or would they still be able to do echo that is scheduled this Thursday 6/12/25. Please call to advise

## 2025-09-03 RX ORDER — CARVEDILOL 25 MG/1
25 TABLET ORAL 2 TIMES DAILY
Qty: 180 TABLET | Refills: 3 | Status: SHIPPED | OUTPATIENT
Start: 2025-09-03